# Patient Record
Sex: MALE | Race: WHITE
[De-identification: names, ages, dates, MRNs, and addresses within clinical notes are randomized per-mention and may not be internally consistent; named-entity substitution may affect disease eponyms.]

---

## 2017-03-25 ENCOUNTER — HOSPITAL ENCOUNTER (EMERGENCY)
Dept: HOSPITAL 33 - ED | Age: 77
Discharge: HOME | End: 2017-03-25
Payer: MEDICARE

## 2017-03-25 VITALS — SYSTOLIC BLOOD PRESSURE: 147 MMHG | DIASTOLIC BLOOD PRESSURE: 91 MMHG | OXYGEN SATURATION: 95 %

## 2017-03-25 VITALS — HEART RATE: 80 BPM

## 2017-03-25 DIAGNOSIS — R51: Primary | ICD-10-CM

## 2017-03-25 PROCEDURE — 99283 EMERGENCY DEPT VISIT LOW MDM: CPT

## 2017-03-25 PROCEDURE — 70450 CT HEAD/BRAIN W/O DYE: CPT

## 2017-03-25 NOTE — ERPHSYRPT
- History of Present Illness


Time Seen by Provider: 03/25/17 08:52


Source: patient


Exam Limitations: no limitations


Patient Subjective Stated Complaint: PT COMES IN STATES "MY HEAD JUST FEELS 

CLOGGED LIKE MY SINUS'S" STATES THAT HE HAS SEEN HIS FAMILY MD X 5 TIMES FOR 

SAME THING STATES THAT THIS PROBLEMS HAS BEEN GOING ON X 2 MONTHS. FAMILY 

STATES HE HAS BEEN ON NOSE SPRAY ,.  STEROIDS,ANTIBIOTICS BUT NOTHING IS 

HELPING PT DENIES A COUGH,NASUEA VOMITING.


Triage Nursing Assessment: PT ALERT WARM AND DRY RESP EASY NON LABORED PT 

AMBULATED TO ROOM WITHOUT DIFFICULTY. PT HARD OF HEARING.


Physician History: 





Pt. with chronic headache for past 2 months, usually frontal headache, dull, 

intermittent, sinus pressure, tinnitus and generalized weakness.  Pt. have been 

seen by primary provider X 5 with minimal relief.  Denies any blurred vision, 

dizziness, AMS, speech difficulty or ataxia.  Denies any recent illnesses, fever

, chills, cough, chest pain, palpitations or shortness of breath.  Lives with 

daughter-in-law.  Pt. with decrease hearing and doesn't want to see "hearing 

doctor" due to not having funds.


Timing/Duration: intermittent, other (2 months)


Quality: dullness


Head Pain Location: frontal


Severity of Pain-Max: moderate


Severity of Pain-Current: moderate


Recent Head Trauma: no recent headache/trauma


Modifying Factors: Improves With: other (Atbcs/decongestants with minimal relief

)


Associated Symptoms: facial pain, nasal congestion, No confusion, No dizziness, 

No fever/chills, No nausea/vomiting, No nasal drainage, No vision changes, No 

visual disturbance


Allergies/Adverse Reactions: 








Penicillins Allergy (Intermediate, Verified 02/16/15 08:38)


 Rash


Sulfa (Sulfonamide Antibiotics) Allergy (Intermediate, Verified 02/16/15 08:38)


 Rash





Home Medications: 








Albuterol Sulfate [Proair Hfa] 0 gm IH BID 02/15/15 [History]


Alprazolam 2 mg PO TIDPRN PRN 02/15/15 [History]


Hydrocodone Bit/Acetaminophen [Hydrocodon-Acetaminophn ] 1 each PO Q6HPRN 

PRN 02/15/15 [History]





Hx Tetanus, Diphtheria Vaccination/Date Given: Yes


Hx Influenza Vaccination/Date Given: Yes


Hx Pneumococcal Vaccination/Date Given: No


Immunizations Up to Date: Yes





- Review of Systems


Constitutional: Weakness (generalize, non focal), No Fever, No Chills


Eyes: No Symptoms


Ears, Nose, & Throat: Hearing Changes, Tinnitus, Nose Congestion, No Throat Pain

, No Throat Swelling


Respiratory: Cough (dry), No Dyspnea


Cardiac: No Symptoms, No Chest Pain, No Edema, No Syncope


Abdominal/Gastrointestinal: No Abdominal Pain, No Nausea, No Vomiting, No 

Diarrhea


Genitourinary Symptoms: No Dysuria


Musculoskeletal: No Symptoms, No Back Pain, No Neck Pain


Skin: No Rash


Neurological: No Dizziness, No Focal Weakness, No Sensory Changes


Psychological: No Symptoms


Endocrine: No Symptoms


All Other Systems: Reviewed and Negative





- Past Medical History


Pertinent Past Medical History: Yes


Neurological History: No Pertinent History


ENT History: Cataracts


Cardiac History: Hypertension


Respiratory History: COPD


Endocrine Medical History: No Pertinent History


Musculoskeletal History: Osteoarthritis


GI Medical History: GERD


 History: No Pertinent History


Psycho-Social History: Anxiety, Depression


Male Reproductive Disorders: No Pertinent History





- Past Surgical History


Past Surgical History: Yes


Neuro Surgical History: No Pertinent History


Cardiac: No Pertinent History


Respiratory: No Pertinent History


Gastrointestinal: Hernia Repair


Genitourinary: No Pertinent History


Musculoskeletal: No Pertinent History


Male Surgical History: No Pertinent History


Other Surgical History: cataract surgery





- Social History


Smoking Status: Current every day smoker


How long have you smoked: 60 YEARS


Exposure to second hand smoke: Yes


Drug Use: none


Patient Lives Alone: No





- Nursing Vital Signs


Nursing Vital Signs: 


 Initial Vital Signs











Temperature                    97.8 F


 


Temperature Source             Oral


 


Pulse Rate                     84


 


Respiratory Rate               18


 


Blood Pressure [Right Arm]     147/91

















- Physical Exam


General Appearance: no apparent distress


Eye Exam: PERRL/EOMI, eyes nml inspection


Ears, Nose, Throat Exam: normal ENT inspection, moist mucous membranes, other (

mild tenderness frontal sinus to palpation)


Neck Exam: normal inspection, supple, full range of motion, No meningismus


Respiratory Exam: normal breath sounds, lungs clear


Cardiovascular Exam: regular rate/rhythm, normal heart sounds


Gastrointestinal/Abdominal Exam: soft, No tenderness, No distention


Back Exam: normal inspection, normal range of motion


Extremity Exam: normal inspection, normal range of motion


Mental Status Exam: alert, oriented x 3, cooperative


CNs Exam: normal hearing, PERRL, No abnormal speech, No facial droop


Coordination/Gait Exam: normal finger to nose, normal cerebellar function


Motor/Sensory Exam: no motor deficit, no sensory deficit


DTR Exam: knee (R): 2+, knee (L): 2+


Skin Exam: normal color, warm, dry, No rash


Lymphatic Exam: No adenopathy


**SpO2 Interpretation**: normal


SpO2: 95


Oxygen Delivery: Room Air





- Course


Nursing assessment & vital signs reviewed: Yes





- CT Exams


  ** Head


CT Interpretation: Negative, Tele-radiologist Report


Ordered Tests: 


 Active Orders 24 hr











 Category Date Time Status


 


 HEAD WITHOUT CONTRAST [CT] Stat Exams  03/25/17 09:22 Taken








Medication Summary














Discontinued Medications














Generic Name Dose Route Start Last Admin





  Trade Name Ren  PRN Reason Stop Dose Admin


 


Acetaminophen  1,000 mg  03/25/17 09:22  03/25/17 09:25





  Tylenol Extra Strength 500 Mg***  PO  03/25/17 09:23  1,000 mg





  STAT STA   Administration


 


Acetaminophen  Confirm  03/25/17 09:25  





  Tylenol Extra Strength 500 Mg***  Administered  03/25/17 09:26  





  Dose   





  1,000 mg   





  .ROUTE   





  .STAuxmoney-MED ONE   














- Progress


Progress: improved


Air Movement: good


Progress Note: 





03/25/17 09:52


Pt. given Tylenol with some improvement of symptoms.


Blood Culture(s) Obtained: No


Antibiotics given: No


Counseled pt/family regarding: diagnosis, rad results





- Departure


Time of Disposition: 10:09


Departure Disposition: Home


Clinical Impression: 


 Headache





Condition: Stable


Critical Care Time: No


Referrals: 


TEMITOPE LASSITER [Primary Care Provider] - 


Instructions:  Headache


Additional Instructions: 


Return for worse headaches, numbness, blurred vision, difficulty speaking, 

altered mental status or any problems.

## 2017-03-25 NOTE — XRAY
Indication: Headache.



Multiple contiguous axial images obtained through the head without contrast.



Comparison: None



There is age-appropriate global atrophy and mild periventricular degenerative

microvascular ischemia bilaterally.  No acute intracranial hemorrhage,

abnormal extra-axial fluid collection, or mass effect.  Fourth ventricle is

midline without hydrocephalus.  Bony calvarium intact.  Visualized paranasal

sinuses clear.  Minimal opacification of the inferior right mastoid air cells.



Impression: Nonacute senile brain.  Minimal right mastoid air cell

opacification presumed inflammatory.



CTDI 70.38

## 2019-03-13 ENCOUNTER — HOSPITAL ENCOUNTER (EMERGENCY)
Dept: HOSPITAL 33 - ED | Age: 79
Discharge: LEFT BEFORE DISCHARGE | End: 2019-03-13
Payer: MEDICARE

## 2019-03-13 VITALS — OXYGEN SATURATION: 94 % | SYSTOLIC BLOOD PRESSURE: 133 MMHG | DIASTOLIC BLOOD PRESSURE: 80 MMHG | HEART RATE: 82 BPM

## 2019-03-13 DIAGNOSIS — Z99.81: ICD-10-CM

## 2019-03-13 DIAGNOSIS — I10: ICD-10-CM

## 2019-03-13 DIAGNOSIS — R91.8: Primary | ICD-10-CM

## 2019-03-13 DIAGNOSIS — K21.9: ICD-10-CM

## 2019-03-13 DIAGNOSIS — F41.8: ICD-10-CM

## 2019-03-13 DIAGNOSIS — M19.90: ICD-10-CM

## 2019-03-13 DIAGNOSIS — R09.02: ICD-10-CM

## 2019-03-13 DIAGNOSIS — J44.9: ICD-10-CM

## 2019-03-13 LAB
ALBUMIN SERPL-MCNC: 4.1 G/DL (ref 3.5–5)
ALP SERPL-CCNC: 127 U/L (ref 38–126)
ALT SERPL-CCNC: 13 U/L (ref 0–50)
ANION GAP SERPL CALC-SCNC: 13.6 MEQ/L (ref 5–15)
AST SERPL QL: 25 U/L (ref 17–59)
BILIRUB BLD-MCNC: 0.8 MG/DL (ref 0.2–1.3)
BUN SERPL-MCNC: 16 MG/DL (ref 9–20)
CALCIUM SPEC-MCNC: 9.3 MG/DL (ref 8.4–10.2)
CELLS COUNTED: 100
CHLORIDE SERPL-SCNC: 100 MMOL/L (ref 98–107)
CO2 SERPL-SCNC: 31 MMOL/L (ref 22–30)
CREAT SERPL-MCNC: 1.06 MG/DL (ref 0.66–1.25)
FATTY CASTS #/AREA UR COMP ASSIST: (no result) /LPF
FLUAV AG NPH QL IA: POSITIVE
FLUBV AG NPH QL IA: NEGATIVE
GLUCOSE SERPL-MCNC: 90 MG/DL (ref 74–106)
GLUCOSE UR-MCNC: NEGATIVE MG/DL
HCT VFR BLD AUTO: 42.3 % (ref 42–50)
HGB BLD-MCNC: 13.5 GM/DL (ref 12.5–18)
MANUAL DIF COMMENT BLD-IMP: NORMAL
MCH RBC QN AUTO: 27.8 PG (ref 26–32)
MCHC RBC AUTO-ENTMCNC: 31.9 G/DL (ref 32–36)
PLATELET # BLD AUTO: 233 K/MM3 (ref 150–450)
POTASSIUM SERPLBLD-SCNC: 3.4 MMOL/L (ref 3.5–5.1)
PROT SERPL-MCNC: 8.6 G/DL (ref 6.3–8.2)
PROT UR STRIP-MCNC: NEGATIVE MG/DL
RBC # BLD AUTO: 4.85 M/MM3 (ref 4.1–5.6)
RBC #/AREA URNS HPF: (no result) /HPF (ref 0–2)
RSV AG SPEC QL IA: NEGATIVE
SODIUM SERPL-SCNC: 140 MMOL/L (ref 137–145)
WBC # BLD AUTO: 9.7 K/MM3 (ref 4–10.5)
WBC #/AREA URNS HPF: (no result) /HPF (ref 0–5)

## 2019-03-13 PROCEDURE — 85025 COMPLETE CBC W/AUTO DIFF WBC: CPT

## 2019-03-13 PROCEDURE — 80053 COMPREHEN METABOLIC PANEL: CPT

## 2019-03-13 PROCEDURE — 94640 AIRWAY INHALATION TREATMENT: CPT

## 2019-03-13 PROCEDURE — 96374 THER/PROPH/DIAG INJ IV PUSH: CPT

## 2019-03-13 PROCEDURE — 36000 PLACE NEEDLE IN VEIN: CPT

## 2019-03-13 PROCEDURE — 81001 URINALYSIS AUTO W/SCOPE: CPT

## 2019-03-13 PROCEDURE — 94150 VITAL CAPACITY TEST: CPT

## 2019-03-13 PROCEDURE — 84484 ASSAY OF TROPONIN QUANT: CPT

## 2019-03-13 PROCEDURE — 36415 COLL VENOUS BLD VENIPUNCTURE: CPT

## 2019-03-13 PROCEDURE — 93005 ELECTROCARDIOGRAM TRACING: CPT

## 2019-03-13 PROCEDURE — 96365 THER/PROPH/DIAG IV INF INIT: CPT

## 2019-03-13 PROCEDURE — 71046 X-RAY EXAM CHEST 2 VIEWS: CPT

## 2019-03-13 PROCEDURE — 87631 RESP VIRUS 3-5 TARGETS: CPT

## 2019-03-13 PROCEDURE — 71260 CT THORAX DX C+: CPT

## 2019-03-13 PROCEDURE — 99284 EMERGENCY DEPT VISIT MOD MDM: CPT

## 2019-03-13 NOTE — XRAY
Indication: Short of breath.  Left lung masslike opacity on same-day chest

radiograph.



Multiple contiguous axial images obtained through the chest using 80 cc Isovue

370 contrast.



Comparison: None



Lungs demonstrate extensive bilateral pulmonary emphysema, scattered right

lung calcified granulomas, and scattered fibrosis/scarring greatest in the

right upper lobe.  There is a large left suprahilar soft tissue mass measuring

at least 10.0 x 8.7 x 10.6 cm in greatest AP, transverse, and CC projections

respectively.  Soft tissue mass encases the left main pulmonary artery with

minimal effacement.  Also mass effect with post-obstructed atelectasis of the

left upper lobe.  No effusion.



Heart is not enlarged but small inferior pericardial effusion/thickening.

Aorta is mildly arteriosclerotic without aneurysm/dissection.  A few small

mediastinal lymph nodes, largest pretracheal measuring 2.1 x 2.2 cm.  Also a

few tiny subcarinal and bilateral hilar calcified nodes.  There are

incompletely visualized bilateral supraclavicular soft tissue masses, largest

on the left measuring at least 2.6 x 4 cm



Bony thorax demonstrates mild osteopenia, mild degenerative changes throughout

the spine, and minimal double curvature scoliosis.



Limited upper abdomen demonstrates mild diffuse fatty liver and 1.4 cm

gallstone.



Impression:

1.  Large left suprahilar soft tissue mass as detailed worrisome for

malignancy.  There is mass effect with slight effacement of the left main

pulmonary artery and post-obstructed left upper lobe atelectasis.  Mass is

amenable to CT-guided biopsy if needed.  Incidental prominent mediastinal node

and incompletely visualized bilateral supraclavicular adenopathy may be

related.

2.  Extensive pulmonary emphysema with scattered fibrosis/scarring and

evidence for old granulomatous disease.

3.  Pericardial effusion/thickening.  Echocardiogram may yield further

information.

4.  Incidental fatty liver and gallstone.



CT DI 7.50

## 2019-03-13 NOTE — ERPHSYRPT
- History of Present Illness


Source: patient


Exam Limitations: no limitations


Patient Subjective Stated Complaint: was sent from clinic for increase sob. 

states started about a week ago, fever a week ago but none now. coughing up 

yellow sputum.


Triage Nursing Assessment: pt alert, resp easy, skin w/d/p. congested counding 

cough, no edema noted


Physician History: 





Pt presented to acute care clinic secondary to SOB.  He was refereed to the ED 

as he was very hypoxic.  Pt states, he is not using O2 at home.  Pt denies F/C/

S.  No chest pain or palpitations.  No N/V/D or abdominal pain.


Timing/Duration: day(s)


Activities at Onset: none


Severity of Dyspnea-Max: moderate


Severity of Dyspnea-Current: mild


Possible Cause: occasional episodes


Modifying Factors: Improves With: exertion


Associated Symptoms: constant, weakness


Allergies/Adverse Reactions: 








Penicillins Allergy (Intermediate, Verified 03/13/19 10:48)


 Rash


Sulfa (Sulfonamide Antibiotics) Allergy (Intermediate, Verified 03/13/19 10:48)


 Rash





Home Medications: 








Albuterol Sulfate [Proair Hfa] 0 gm IH BID 02/15/15 [History]


Alprazolam 2 mg PO TIDPRN PRN 02/15/15 [History]


Hydrocodone Bit/Acetaminophen [Hydrocodon-Acetaminophn ] 1 each PO Q6HPRN 

PRN 02/15/15 [History]


Ranitidine HCl [Zantac] 150 mg BID 03/13/19 [History]





Hx Tetanus, Diphtheria Vaccination/Date Given: Yes


Hx Influenza Vaccination/Date Given: Yes


Hx Pneumococcal Vaccination/Date Given: Yes


Immunizations Up to Date: Yes





- Review of Systems


Constitutional: Fatigue


Eyes: No Symptoms


Ears, Nose, & Throat: No Symptoms


Respiratory: Cough, Dyspnea, Dyspnea on Exertion (DIAS)


Cardiac: No Chest Pain, No Edema, No Syncope


Abdominal/Gastrointestinal: No Abdominal Pain, No Nausea, No Vomiting, No 

Diarrhea


Genitourinary Symptoms: No Dysuria


Musculoskeletal: No Back Pain, No Neck Pain


Neurological: No Dizziness, No Focal Weakness, No Sensory Changes





- Past Medical History


Pertinent Past Medical History: Yes


Neurological History: No Pertinent History


ENT History: Cataracts


Cardiac History: Hypertension


Respiratory History: COPD


Endocrine Medical History: No Pertinent History


Musculoskeletal History: Osteoarthritis


GI Medical History: GERD


 History: No Pertinent History


Psycho-Social History: Anxiety, Depression


Male Reproductive Disorders: No Pertinent History





- Past Surgical History


Past Surgical History: Yes


Neuro Surgical History: No Pertinent History


Cardiac: No Pertinent History


Respiratory: No Pertinent History


Gastrointestinal: Hernia Repair


Genitourinary: No Pertinent History


Musculoskeletal: No Pertinent History


Male Surgical History: No Pertinent History


Other Surgical History: cataract surgery





- Social History


Smoking Status: Former smoker


How long have you smoked: 60 YEARS


Exposure to second hand smoke: Yes


Drug Use: none


Patient Lives Alone: No





- Nursing Vital Signs


Nursing Vital Signs: 





 Initial Vital Signs











Temperature  98.0 F   03/13/19 10:41


 


Pulse Rate  105 H  03/13/19 10:41


 


Respiratory Rate  18   03/13/19 10:41


 


Blood Pressure  156/103   03/13/19 10:41


 


O2 Sat by Pulse Oximetry  85 L  03/13/19 10:41








 Pain Scale











Pain Intensity                 0

















- Physical Exam


General Appearance: moderate distress (Secondary to respiratory distress and 

hypoxia.)


Eye Exam: PERRL/EOMI


Ears, Nose, Throat Exam: normal ENT inspection


Neck Exam: normal inspection, supple


Respiratory Exam: respiratory distress, diminished breath sounds, accessory 

muscle use, wheezing


Cardiovascular/Chest Exam: normal heart sounds, regular rate/rhythm


Abdominal/Gastrointestinal Exam: soft, No tenderness, No distention, No mass


Extremity Exam: non-tender, normal range of motion, normal inspection, no calf 

tenderness, no pedal edema


Neurologic Exam: alert, oriented x 3, cooperative, CNs II-XII nml as tested, 

sensation nml, No motor deficits


**SpO2 Interpretation**: hypoxic


SpO2: 93





- Course


Nursing assessment & vital signs reviewed: Yes


EKG Interpreted by Me: RATE (105bpm), Sinus Rhythm, Non-specific ST Changes





- CT Exams


  ** Chest


CT Interpretation: Other (Large L suprahilar soft tissue mass.  Worrisome for 

malignancy.  Pulmonary emphysema.  pericardial effusion/thickening.)


Ordered Tests: 





 Active Orders 24 hr











 Category Date Time Status


 


 EKG-ER Only STAT Care  03/13/19 10:55 Active


 


 IV Insertion STAT Care  03/13/19 11:22 Active


 


 Oxygen-ED Only Nasal Cannula 4 lpm Care  03/13/19 10:55 Active


 


 CHEST 2 VIEWS (PA AND LAT) Stat Exams  03/13/19 10:56 Completed


 


 CHEST WITH CONTRAST [CT] Stat Exams  03/13/19 12:02 Completed


 


 CBC W DIFF Stat Lab  03/13/19 11:20 Completed


 


 CMP Stat Lab  03/13/19 11:20 Completed


 


 Manual Differential NC Stat Lab  03/13/19 11:20 Completed


 


 TROPONIN Q3H Lab  03/13/19 11:20 Completed


 


 TROPONIN Q3H Lab  03/13/19 14:00 Ordered


 


 TROPONIN Q3H Lab  03/13/19 17:00 Ordered


 


 TROPONIN Q3H Lab  03/13/19 20:00 Ordered


 


 TROPONIN Q3H Lab  03/13/19 23:00 Ordered


 


 UA W/RFX UR CULTURE Stat Lab  03/13/19 Completed


 


 Peak Expiratory Flow Rate ONCE RT  03/13/19 11:15 Active


 


 Respiratory Nebulizer STAT RT  03/13/19 10:57 Completed


 


 Respiratory Therapy Assessment DAILY RT  03/13/19 11:15 Active








Medication Summary














Discontinued Medications














Generic Name Dose Route Start Last Admin





  Trade Name Freq  PRN Reason Stop Dose Admin


 


Albuterol/Ipratropium  3 ml  03/13/19 10:55  03/13/19 11:12





  Duoneb 0.5-3 Mg/3 Ml Neb**  IH  03/13/19 10:56  3 ml





  STAT ONE   Administration





     





     





     





     


 


Albuterol/Ipratropium  Confirm  03/13/19 11:04  





  Duoneb 0.5-3 Mg/3 Ml Neb**  Administered  03/13/19 11:05  





  Dose   





  3 ml   





  IH   





  .STK-MED ONE   





     





     





     





     


 


Ceftriaxone Sodium/Dextrose  1 g in 50 mls @ 100 mls/hr  03/13/19 10:55  03/13/ 19 11:47





  Rocephin 1 Gm-D5w 50 Ml Bag**  IV  03/13/19 11:24  100 ml/hr





  STAT STA   100 mls/hr





     Administration





     





     





     


 


Azithromycin  500 mg in 250 mls @ 250 mls/hr  03/13/19 10:55  03/13/19 12:56





  Zithromax 500 Mg/ 250 Ml Nacl Premix  IV  03/13/19 11:54  250 ml/hr





  STAT STA   250 mls/hr





     Administration





     





     





     


 


Azithromycin  Confirm  03/13/19 11:30  





  Zithromax 500 Mg/ 250 Ml Nacl Premix  Administered  03/13/19 11:31  





  Dose   





  500 mg in 250 mls @ ud   





  IV   





  .STK-MED ONE   





     





     





     





     


 


Ceftriaxone Sodium/Dextrose  Confirm  03/13/19 11:30  





  Rocephin 1 Gm-D5w 50 Ml Bag**  Administered  03/13/19 11:31  





  Dose   





  1 g in 50 mls @ ud   





  IV   





  .STK-MED ONE   





     





     





     





     


 


Methylprednisolone Sodium Succinate  125 mg  03/13/19 10:55  03/13/19 11:46





  Solu-Medrol 125 Mg***  IV  03/13/19 10:56  125 mg





  STAT ONE   Administration





     





     





     





     


 


Methylprednisolone Sodium Succinate  Confirm  03/13/19 11:30  





  Solu-Medrol 125 Mg***  Administered  03/13/19 11:31  





  Dose   





  125 mg   





  .ROUTE   





  .STK-MED ONE   





     





     





     





     


 


Oseltamivir Phosphate  75 mg  03/13/19 12:08  03/13/19 13:11





  Tamiflu 75mg Capsule***  PO  03/13/19 12:09  75 mg





  STAT ONE   Administration





     





     





     





     


 


Oseltamivir Phosphate  Confirm  03/13/19 12:59  





  Tamiflu 75mg Capsule***  Administered  03/13/19 13:00  





  Dose   





  75 mg   





  PO   





  .STK-MED ONE   





     





     





     





     











Lab/Rad Data: 





 Laboratory Result Diagrams





 03/13/19 11:20 





 03/13/19 11:20 





 Laboratory Results











  03/13/19 03/13/19 03/13/19 Range/Units





  Unknown 11:22 11:20 


 


WBC     (4.0-10.5)  K/mm3


 


RBC     (4.1-5.6)  M/mm3


 


Hgb     (12.5-18.0)  gm/dl


 


Hct     (42-50)  %


 


MCV     ()  fl


 


MCH     (26-32)  pg


 


MCHC     (32-36)  g/dl


 


RDW     (11.5-14.0)  %


 


Plt Count     (150-450)  K/mm3


 


MPV     (6-9.5)  fl


 


Segmented Neutrophils     (36.-66.)  %


 


Lymphocytes (Manual)     (24-44)  %


 


Monocytes (Manual)     (0.0-12.0)  %


 


Platelet Estimate     (NORMAL)  


 


RBC Morphology     


 


Sodium     (137-145)  mmol/L


 


Potassium     (3.5-5.1)  mmol/L


 


Chloride     ()  mmol/L


 


Carbon Dioxide     (22-30)  mmol/L


 


Anion Gap     (5-15)  MEQ/L


 


BUN     (9-20)  mg/dL


 


Creatinine     (0.66-1.25)  mg/dL


 


Estimated GFR     ML/MIN


 


Glucose     ()  mg/dL


 


Calcium     (8.4-10.2)  mg/dL


 


Total Bilirubin     (0.2-1.3)  mg/dL


 


AST     (17-59)  U/L


 


ALT     (0-50)  U/L


 


Alkaline Phosphatase     ()  U/L


 


Troponin I    < 0.012  (0.000-0.034)  ng/mL


 


Serum Total Protein     (6.3-8.2)  g/dL


 


Albumin     (3.5-5.0)  g/dL


 


Urine Color  STRAW    (YELLOW)  


 


Urine Appearance  CLEAR    (CLEAR)  


 


Urine pH  6.0    (5-6)  


 


Ur Specific Gravity  1.009    (1.005-1.025)  


 


Urine Protein  NEGATIVE    (Negative)  


 


Urine Ketones  NEGATIVE    (NEGATIVE)  


 


Urine Blood  NEGATIVE    (0-5)  Nahum/ul


 


Urine Nitrite  NEGATIVE    (NEGATIVE)  


 


Urine Bilirubin  NEGATIVE    (NEGATIVE)  


 


Urine Urobilinogen  NEGATIVE    (0-1)  mg/dL


 


Ur Leukocyte Esterase  NEGATIVE    (NEGATIVE)  


 


Urine WBC (Auto)  NONE    (0-5)  /HPF


 


Urine RBC (Auto)  NONE    (0-2)  /HPF


 


U Epithel Cells (Auto)  NONE    (FEW)  /HPF


 


Urine Bacteria (Auto)  NONE    (NEGATIVE)  /HPF


 


Fatty Casts  N    (NEGATIVE)  /LPF


 


Urine Mucus (Auto)  SLIGHT    (NEGATIVE)  /HPF


 


Urine Culture Reflexed  NO    (NO)  


 


Urine Glucose  NEGATIVE    (NEGATIVE)  mg/dL


 


Influenza Type A Ag   POSITIVE   (NEGATIVE)  


 


Influenza Type B Ag   NEGATIVE   (NEGATIVE)  


 


RSV (PCR)   NEGATIVE   (Negative)  














  03/13/19 03/13/19 Range/Units





  11:20 11:20 


 


WBC   9.7  (4.0-10.5)  K/mm3


 


RBC   4.85  (4.1-5.6)  M/mm3


 


Hgb   13.5  (12.5-18.0)  gm/dl


 


Hct   42.3  (42-50)  %


 


MCV   87.2  ()  fl


 


MCH   27.8  (26-32)  pg


 


MCHC   31.9 L  (32-36)  g/dl


 


RDW   14.6 H  (11.5-14.0)  %


 


Plt Count   233  (150-450)  K/mm3


 


MPV   11.4 H  (6-9.5)  fl


 


Segmented Neutrophils   63  (36.-66.)  %


 


Lymphocytes (Manual)   35  (24-44)  %


 


Monocytes (Manual)   2  (0.0-12.0)  %


 


Platelet Estimate   NORMAL  (NORMAL)  


 


RBC Morphology   NORMAL  


 


Sodium  140   (137-145)  mmol/L


 


Potassium  3.4 L   (3.5-5.1)  mmol/L


 


Chloride  100   ()  mmol/L


 


Carbon Dioxide  31 H   (22-30)  mmol/L


 


Anion Gap  13.6   (5-15)  MEQ/L


 


BUN  16   (9-20)  mg/dL


 


Creatinine  1.06   (0.66-1.25)  mg/dL


 


Estimated GFR  > 60.0   ML/MIN


 


Glucose  90   ()  mg/dL


 


Calcium  9.3   (8.4-10.2)  mg/dL


 


Total Bilirubin  0.80   (0.2-1.3)  mg/dL


 


AST  25   (17-59)  U/L


 


ALT  13   (0-50)  U/L


 


Alkaline Phosphatase  127 H   ()  U/L


 


Troponin I    (0.000-0.034)  ng/mL


 


Serum Total Protein  8.6 H   (6.3-8.2)  g/dL


 


Albumin  4.1   (3.5-5.0)  g/dL


 


Urine Color    (YELLOW)  


 


Urine Appearance    (CLEAR)  


 


Urine pH    (5-6)  


 


Ur Specific Gravity    (1.005-1.025)  


 


Urine Protein    (Negative)  


 


Urine Ketones    (NEGATIVE)  


 


Urine Blood    (0-5)  Nahum/ul


 


Urine Nitrite    (NEGATIVE)  


 


Urine Bilirubin    (NEGATIVE)  


 


Urine Urobilinogen    (0-1)  mg/dL


 


Ur Leukocyte Esterase    (NEGATIVE)  


 


Urine WBC (Auto)    (0-5)  /HPF


 


Urine RBC (Auto)    (0-2)  /HPF


 


U Epithel Cells (Auto)    (FEW)  /HPF


 


Urine Bacteria (Auto)    (NEGATIVE)  /HPF


 


Fatty Casts    (NEGATIVE)  /LPF


 


Urine Mucus (Auto)    (NEGATIVE)  /HPF


 


Urine Culture Reflexed    (NO)  


 


Urine Glucose    (NEGATIVE)  mg/dL


 


Influenza Type A Ag    (NEGATIVE)  


 


Influenza Type B Ag    (NEGATIVE)  


 


RSV (PCR)    (Negative)  














- Progress


Progress: improved


Air Movement: poor


Progress Note: 





03/13/19 14:23


Pt was treated with Rocephin, Azithromycin and Tamiflu.  He was given neb 

treatment and Solu Medrol 125mg IV.  As CT showed large mass, and emphysema, I 

contacted [facility] and Dr Peter accepted pt for transfer.





- Departure


Time of Disposition: 14:24


Departure Disposition: Transfer ([facility].  Dr Peter is accepting.)


Clinical Impression: 


 Mass of left lung





Condition: Stable


Critical Care Time: No


Referrals: 


TEMITOPE LASSITER [Primary Care Provider] -

## 2019-03-13 NOTE — XRAY
Indication: Short of breath.



Comparison: October 11, 2018.



PA/lateral chest again demonstrates COPD and bilateral scattered

fibrosis/scarring.  New left suprahilar masslike opacity warrants additional

imaging.  Heart and mediastinal structures within normal limits.  Bony thorax

intact again with degenerative changes and minimal scoliosis.



Impression:

1.  New left suprahilar masslike opacity.  Recommend CT chest with contrast.

2.  Stable COPD and scattered fibrosis/scarring.